# Patient Record
Sex: MALE | Race: WHITE
[De-identification: names, ages, dates, MRNs, and addresses within clinical notes are randomized per-mention and may not be internally consistent; named-entity substitution may affect disease eponyms.]

---

## 2018-08-28 ENCOUNTER — HOSPITAL ENCOUNTER (OUTPATIENT)
Dept: HOSPITAL 7 - FB.SDS | Age: 83
Discharge: HOME | End: 2018-08-28
Attending: OPHTHALMOLOGY
Payer: MEDICARE

## 2018-08-28 DIAGNOSIS — Z79.4: ICD-10-CM

## 2018-08-28 DIAGNOSIS — I50.9: ICD-10-CM

## 2018-08-28 DIAGNOSIS — N40.1: ICD-10-CM

## 2018-08-28 DIAGNOSIS — K21.9: ICD-10-CM

## 2018-08-28 DIAGNOSIS — I11.0: ICD-10-CM

## 2018-08-28 DIAGNOSIS — Z95.1: ICD-10-CM

## 2018-08-28 DIAGNOSIS — Z79.899: ICD-10-CM

## 2018-08-28 DIAGNOSIS — Z79.82: ICD-10-CM

## 2018-08-28 DIAGNOSIS — Z87.891: ICD-10-CM

## 2018-08-28 DIAGNOSIS — H25.813: ICD-10-CM

## 2018-08-28 DIAGNOSIS — E11.36: Primary | ICD-10-CM

## 2018-08-28 PROCEDURE — 00142 ANES PX ON EYE LENS SURGERY: CPT

## 2018-08-28 PROCEDURE — 82962 GLUCOSE BLOOD TEST: CPT

## 2018-08-28 PROCEDURE — 66984 XCAPSL CTRC RMVL W/O ECP: CPT

## 2018-08-28 PROCEDURE — C1780 LENS, INTRAOCULAR (NEW TECH): HCPCS

## 2018-08-28 NOTE — OR
DATE OF OPERATION:  08/28/2018

 

SURGEON:  Cal Costello MD

 

PREOPERATIVE DIAGNOSIS:  Cataract, right eye.

 

POSTOPERATIVE DIAGNOSIS:  Cataract, right eye.

 

PROCEDURES:  Phacoemulsification of cataract right eye with the placement of an

Abbott model, ZCB00, 21.0 diopter, foldable, posterior chamber intraocular lens.

 

ASSISTANT:  None.

 

DESCRIPTION OF PROCEDURE:  Peribulbar anesthetic was performed using a mixture

of 2% lidocaine with Wydase.  The patient was prepped and draped in the usual

fashion.  A 3 mm fornix based conjunctival flap was performed at the 10 o'clock

position.  Hemostasis was obtained using diathermy, and a 2.8 mm grooved near

clear corneal incision was then made.  A stab incision was made into the

anterior chamber at the 12 o'clock position and a second stab wound incision was

made underlying the grooved near clear corneal incision.  Viscoat was instilled

into the anterior chamber, and a continuous tear capsulotomy was performed.

Hydrodissection was accomplished with balanced salt solution, and the nucleus

was removed in a divide and conquer fashion.  The remaining cortical material

was removed with the irrigation and aspiration unit.  Viscoat was instilled into

the anterior chamber, and an Abbott model, ZCB00, 21.0 diopter, foldable,

posterior chamber intraocular lens was placed into the capsular bag, the haptics

being positioned at the 3 and 9 o'clock positions.  The residual Viscoat was

removed from the anterior chamber and the anterior chamber reformed with

balanced salt solution.  The wound was checked and noted to be watertight.  The

conjunctiva was secured in its original position with diathermy.  Alphagan and

Maxitrol Ointment were then placed into the patient's eye.  The patient

tolerated the procedure well and it was without complication.  Elapsed

phacoemulsification time was 49.1 seconds.

 

Postoperative instructions as related to activities as well as medications were

reviewed with the patient.  The patient was instructed to return to see me on

the day following surgery for the first postoperative check.  The patient was

also instructed to contact me prior to that time if the patient was to have any

problems.

 

Job#: [id number]

DD: 08/28/2018 1010

DT: 08/28/2018 1431 DEG/MODL



CC:  RUDI PROCTOR PA-C MTDD

## 2018-09-25 ENCOUNTER — HOSPITAL ENCOUNTER (OUTPATIENT)
Dept: HOSPITAL 7 - FB.SDS | Age: 83
Discharge: HOME | End: 2018-09-25
Attending: OPHTHALMOLOGY
Payer: MEDICARE

## 2018-09-25 DIAGNOSIS — Z96.1: ICD-10-CM

## 2018-09-25 DIAGNOSIS — H25.812: ICD-10-CM

## 2018-09-25 DIAGNOSIS — E11.36: Primary | ICD-10-CM

## 2018-09-25 DIAGNOSIS — Z79.84: ICD-10-CM

## 2018-09-25 DIAGNOSIS — K21.9: ICD-10-CM

## 2018-09-25 DIAGNOSIS — H52.223: ICD-10-CM

## 2018-09-25 DIAGNOSIS — I25.119: ICD-10-CM

## 2018-09-25 DIAGNOSIS — I11.0: ICD-10-CM

## 2018-09-25 DIAGNOSIS — Z98.41: ICD-10-CM

## 2018-09-25 DIAGNOSIS — H52.13: ICD-10-CM

## 2018-09-25 DIAGNOSIS — Z79.899: ICD-10-CM

## 2018-09-25 DIAGNOSIS — N40.1: ICD-10-CM

## 2018-09-25 DIAGNOSIS — H52.4: ICD-10-CM

## 2018-09-25 DIAGNOSIS — I50.9: ICD-10-CM

## 2018-09-25 DIAGNOSIS — Z87.891: ICD-10-CM

## 2018-09-25 PROCEDURE — C1780 LENS, INTRAOCULAR (NEW TECH): HCPCS

## 2019-04-26 ENCOUNTER — HOSPITAL ENCOUNTER (OUTPATIENT)
Dept: HOSPITAL 7 - FB.SDS | Age: 84
Discharge: HOME | End: 2019-04-26
Attending: SURGERY
Payer: MEDICARE

## 2019-04-26 VITALS — DIASTOLIC BLOOD PRESSURE: 72 MMHG | SYSTOLIC BLOOD PRESSURE: 142 MMHG

## 2019-04-26 DIAGNOSIS — Z80.0: ICD-10-CM

## 2019-04-26 DIAGNOSIS — I25.2: ICD-10-CM

## 2019-04-26 DIAGNOSIS — Z95.1: ICD-10-CM

## 2019-04-26 DIAGNOSIS — Z79.899: ICD-10-CM

## 2019-04-26 DIAGNOSIS — I11.0: ICD-10-CM

## 2019-04-26 DIAGNOSIS — Z87.891: ICD-10-CM

## 2019-04-26 DIAGNOSIS — Z79.82: ICD-10-CM

## 2019-04-26 DIAGNOSIS — K29.50: Primary | ICD-10-CM

## 2019-04-26 DIAGNOSIS — K31.89: ICD-10-CM

## 2019-04-26 DIAGNOSIS — K21.9: ICD-10-CM

## 2019-04-26 DIAGNOSIS — Z79.84: ICD-10-CM

## 2019-04-26 DIAGNOSIS — K29.80: ICD-10-CM

## 2019-04-26 DIAGNOSIS — Z88.8: ICD-10-CM

## 2019-04-26 DIAGNOSIS — E11.9: ICD-10-CM

## 2019-04-26 DIAGNOSIS — I25.10: ICD-10-CM

## 2019-04-26 DIAGNOSIS — I50.9: ICD-10-CM

## 2019-04-26 DIAGNOSIS — I25.810: ICD-10-CM

## 2019-04-26 DIAGNOSIS — K25.9: ICD-10-CM

## 2019-04-26 NOTE — OR
DATE OF OPERATION:  04/26/2019

 

SURGEON:  Jasmeet Ceja MD

 

PROCEDURE PERFORMED:  Upper endoscopy with cold forceps biopsy.

 

PREOPERATIVE DIAGNOSIS:  Right upper quadrant abdominal pain.

 

POSTOPERATIVE DIAGNOSES:  Duodenitis and gastritis with ulceration.

 

INDICATIONS FOR PROCEDURE:  This is an 85-year-old white male, referred with

above-mentioned history of some upper abdominal pain.  Workup to date of his

gallbladder has essentially been unremarkable.  He was offered and accepted an

EGD to rule out peptic ulcer disease.

 

DESCRIPTION OF OPERATION:  After an excellent IV sedation was administered, bite

block was inserted.  Flexible endoscope was passed without difficulty down the

patient's esophagus.  Stomach was insufflated, scope passed through pylorus to

second portion of the duodenum and slowly withdrawn.  The following findings

were noted.  The duodenum, especially in the first portion appeared to have some

duodenitis.  Mucosa also appeared to be atrophic.  Multiple biopsies were taken.

Stomach; in the area of the antrum, there were evidence of gastritis as well as

what appears to be punctate healing ulcerations.  Multiple biopsies were taken

of this area.  Esophagus, no marked abnormalities.  Stomach was deflated.  Scope

was removed.  The patient tolerated the procedure well.

 

Job#: [id number]

DD: 04/26/2019 0941

DT: 04/26/2019 1655 AS/MODL

## 2019-04-26 NOTE — PCM.OPNOTE
- General Post-Op/Procedure Note


Date of Surgery/Procedure: 04/26/19


Operative Procedure(s): egd with bx


Findings: 





duodenitis with atrophic appearing mucosa


gastritis with punctate ulcerations 


Pre Op Diagnosis: ruq abd pain


Post-Op Diagnosis: duodenitis with atrophic appearing mucosa.  gastritis with 

punctate ulcerations


Anesthesia Technique: [facility] [place]


Primary Surgeon: Jasmeet Ceja


Anesthesia Provider: Jonathan Ruano


Pathology: 





stomach and duodenum 


Complications: None


Condition: Good


Free Text/Narrative:: 





see dictation

## 2021-02-15 NOTE — OR
DATE OF OPERATION:  09/25/2018

 

SURGEON:  Cal Costello MD

 

PREOPERATIVE DIAGNOSIS:  Cataract, left eye.

 

POSTOPERATIVE DIAGNOSIS:  Cataract, left eye.

 

PROCEDURES:  Phacoemulsification of cataract, left eye, with the placement of an

Abbott model, ZCB00, 21.0 diopter, foldable, posterior chamber intraocular lens.

 

ASSISTANT:  None.

 

DESCRIPTION OF PROCEDURE:  Peribulbar anesthetic was performed using a mixture

of 2% lidocaine with Wydase.  The patient was prepped and draped in the usual

fashion.  A 3 mm fornix based conjunctival flap was performed at the 10 o'clock

position.  Hemostasis was obtained using diathermy, and a 2.8 mm grooved near

clear corneal incision was then made.  A stab incision was made into the

anterior chamber at the 12 o'clock position and a second stab wound incision was

made underlying the grooved near clear corneal incision.  Viscoat was instilled

into the anterior chamber, and a continuous tear capsulotomy was performed.

Hydrodissection was accomplished with balanced salt solution, and the nucleus

was removed in a divide and conquer fashion.  The remaining cortical material

was removed with the irrigation and aspiration unit.  Viscoat was instilled into

the anterior chamber, and Abbott model, ZCB00, 21.0 diopter, foldable, posterior

chamber intraocular lens was placed into the capsular bag, the haptics being

positioned at the 3 and 9 o'clock positions.  The residual Viscoat was removed

from the anterior chamber and the anterior chamber reformed with balanced salt

solution.  The wound was checked and noted to be watertight.  The conjunctiva

was secured in its original position with diathermy.  Alphagan and Maxitrol

Ointment were then placed into the patient's eye.  The patient tolerated the

procedure well and it was without complication.  Elapsed phacoemulsification

time was 35.9 seconds.

 

Postoperative instructions as related to activities as well as medications were

reviewed with the patient.  The patient was instructed to return to see me on

the day following surgery for the first postoperative check.  The patient was

also instructed to contact me prior to that time if the patient were to have any

problems.

 

Job#: [id number]

DD: 09/25/2018 1043

DT: 09/25/2018 1658 DEG/MODL

 

CC:  RUDI PROCTOR PA-C Normal, please notify patient